# Patient Record
Sex: MALE | Race: WHITE | Employment: UNEMPLOYED | URBAN - NONMETROPOLITAN AREA
[De-identification: names, ages, dates, MRNs, and addresses within clinical notes are randomized per-mention and may not be internally consistent; named-entity substitution may affect disease eponyms.]

---

## 2023-09-15 ENCOUNTER — OUTSIDE SERVICES (OUTPATIENT)
Dept: FAMILY MEDICINE CLINIC | Age: 88
End: 2023-09-15

## 2023-09-15 DIAGNOSIS — N18.4 STAGE 4 CHRONIC KIDNEY DISEASE (HCC): ICD-10-CM

## 2023-09-15 DIAGNOSIS — E87.5 HYPERKALEMIA: Primary | ICD-10-CM

## 2023-09-15 DIAGNOSIS — I48.91 ATRIAL FIBRILLATION, UNSPECIFIED TYPE (HCC): ICD-10-CM

## 2023-09-15 DIAGNOSIS — I25.10 CORONARY ARTERY DISEASE, UNSPECIFIED VESSEL OR LESION TYPE, UNSPECIFIED WHETHER ANGINA PRESENT, UNSPECIFIED WHETHER NATIVE OR TRANSPLANTED HEART: ICD-10-CM

## 2023-09-15 DIAGNOSIS — I50.9 CONGESTIVE HEART FAILURE, UNSPECIFIED HF CHRONICITY, UNSPECIFIED HEART FAILURE TYPE (HCC): ICD-10-CM

## 2023-09-15 NOTE — PROGRESS NOTES
09/14/2023      Alden Castro  11/5/1933    This resident is being seen today for follow-up evaluation visit. He is a resident who has long-term medical conditions including acute on chronic systolic congestive heart failure complicated by pneumonia, chronic kidney disease, coronary disease, COPD, mitral valvular disease status post mitral valve replacement, cardiac arrhythmia/atrial fibrillation. He is a 80 y.o. male resident who is being seen today for follow-up visit with which she has been under assessment for hyperkalemia and I had concerns that this could be secondary to his Entresto. I did ask staff to contact his cardiologist in this regard and they stated that they have faxed him 4 times and call did still have no response back. I did make recommendations for repeat BMP in this regard. Staff also has concerns because this resident is still driving himself and they have asked for speech therapy to evaluate and treat regarding cognition. He is a gentleman who was recently given the benefit of an occupational evaluation for scooter use but still goes and exercises daily and is able to walk without much difficulty. He offers no complaints regarding pain on today's evaluation and denies any headaches or dizziness, sore throat, chest pain, coughing or shortness of breath, nausea or vomiting, constipation or diarrhea, fever or chills, falls or syncopal events. Medications:  Aspirin 81 mg daily  Entresto 12-13 mg twice daily  Fluticasone 50 mcg 2 sprays each nostril daily  Furosemide 40 mg twice daily  Metoprolol succinate 25 mg daily  PreserVision reds 1 capsule twice daily  Spironolactone 25 mg daily  Trazodone 100 mg at bedtime  Trelegy 200-62 point 5-25 1 puff daily  Vitamin D 5000 units daily  Ferrous sulfate 325 mg every other day  Albuterol every 4 hours as needed          Objective     Vital Signs: BP 84/47 pulse 78 respirations 18 temperature 97.2 O2 96% weight 183. 3 pounds      Physical

## 2023-10-26 ENCOUNTER — OUTSIDE SERVICES (OUTPATIENT)
Dept: FAMILY MEDICINE CLINIC | Age: 88
End: 2023-10-26

## 2023-10-26 DIAGNOSIS — I05.9 MITRAL VALVE DISEASE: ICD-10-CM

## 2023-10-26 DIAGNOSIS — I50.9 CONGESTIVE HEART FAILURE, UNSPECIFIED HF CHRONICITY, UNSPECIFIED HEART FAILURE TYPE (HCC): ICD-10-CM

## 2023-10-26 DIAGNOSIS — R09.89 RUNNY NOSE: Primary | ICD-10-CM

## 2023-10-26 DIAGNOSIS — N18.4 STAGE 4 CHRONIC KIDNEY DISEASE (HCC): ICD-10-CM

## 2023-10-26 DIAGNOSIS — I25.10 CORONARY ARTERY DISEASE, UNSPECIFIED VESSEL OR LESION TYPE, UNSPECIFIED WHETHER ANGINA PRESENT, UNSPECIFIED WHETHER NATIVE OR TRANSPLANTED HEART: ICD-10-CM

## 2023-10-27 NOTE — PROGRESS NOTES
09/14/2023      Kerline Contreras  11/5/1933    This resident is being seen today for follow-up evaluation visit. He is a resident who has long-term medical conditions including acute on chronic systolic congestive heart failure complicated by pneumonia, chronic kidney disease, coronary disease, COPD, mitral valvular disease status post mitral valve replacement, cardiac arrhythmia/atrial fibrillation. He is a 80 y.o. male resident who is being seen today for follow-up evaluation with resident indicating that he has had some degree of a runny nose. He denies any sore throat, ear pain, coughing or shortness of breath, chest pain, nausea or vomiting, constipation, dysuria or frequency, fever or chills, falls or syncopal events. He is scheduled to be seen in conjunction with the Department of dentistry today. He did bring to my attention that he has bowel movements 4-5 times a day but does not describe them as diarrhea. Medications:  Aspirin 81 mg daily  Entresto 12-13 mg twice daily  Fluticasone 50 mcg 2 sprays each nostril daily  Furosemide 40 mg twice daily  Metoprolol succinate 25 mg daily  PreserVision reds 1 capsule twice daily  Spironolactone 25 mg daily  Trazodone 100 mg at bedtime  Trelegy 200-62 point 5-25 1 puff daily  Vitamin D 5000 units daily  Ferrous sulfate 325 mg every other day  Albuterol every 4 hours as needed          Objective     Vital Signs: /46 pulse 96 respirations 18 temperature 97.3 O2 94% weight 182 pounds      Physical examination:Skin is essentially warm and dry. HEENT unremarkable. Neck is supple. Heart with some question of underlying atrial fibrillation. No rubs, gallops or murmurs noted. Lungs are consistent with bibasilar rales with no evidence of wheezing noted at this time. Abdomen is soft, supple and non-tender. Bowel sounds are noted x4 quadrants. No rigidity, guarding or rebound tenderness. Negative Monroe's, negative McBurney's, negative Derrek's.   Extremities;

## 2023-11-10 ENCOUNTER — OUTSIDE SERVICES (OUTPATIENT)
Dept: FAMILY MEDICINE CLINIC | Age: 88
End: 2023-11-10

## 2023-11-10 DIAGNOSIS — R60.9 PERIPHERAL EDEMA: ICD-10-CM

## 2023-11-10 DIAGNOSIS — E87.5 HYPERKALEMIA: Primary | ICD-10-CM

## 2023-11-10 DIAGNOSIS — R19.4 FREQUENT BOWEL MOVEMENTS: ICD-10-CM

## 2023-11-10 DIAGNOSIS — N18.4 STAGE 4 CHRONIC KIDNEY DISEASE (HCC): ICD-10-CM

## 2023-11-10 DIAGNOSIS — R63.5 WEIGHT GAIN: ICD-10-CM

## 2023-11-11 NOTE — PROGRESS NOTES
09/14/2023      Megan Lopez  11/5/1933    This resident is being seen today for follow-up evaluation visit. He is a resident who has long-term medical conditions including acute on chronic systolic congestive heart failure complicated by pneumonia, chronic kidney disease, coronary disease, COPD, mitral valvular disease status post mitral valve replacement, cardiac arrhythmia/atrial fibrillation. He is a 80 y.o. male resident who is being seen today for follow-up visit with this resident alert and oriented and states that he is up about every 2 hours and has about 5 bowel movements per day. He is a gentleman who has been under assessment for edema to the lower extremities but is nonadherent to offloading and refuses BARRY hose. He denies any headaches or dizziness, sore throat, chest pain, coughing or shortness of breath, nausea or vomiting, constipation or diarrhea, fever or chills, falls or syncopal events. Medications:  Aspirin 81 mg daily  Entresto 12-13 mg twice daily  Fluticasone 50 mcg 2 sprays each nostril daily  Furosemide 40 mg twice daily  Metoprolol succinate 25 mg daily  PreserVision reds 1 capsule twice daily  Trazodone 100 mg at bedtime  Trelegy 200-62 point 5-25 1 puff daily  Vitamin D 5000 units daily  Ferrous sulfate 325 mg every other day  Albuterol every 4 hours as needed  Zaroxolyn 2.5 mg M-W-F  Probiotic twice daily          Objective     Vital Signs: /57 pulse 100 respirations 18 temperature 96.8 O2 98% weight 188. 4 pounds (up 6.4 pounds)        Physical examination:Skin is essentially warm and dry. HEENT unremarkable. Neck is supple. Heart with some question of underlying atrial fibrillation. No rubs, gallops or murmurs noted. Lungs are consistent with bibasilar rales with no evidence of wheezing noted at this time. Abdomen is soft, supple and non-tender. Bowel sounds are noted x4 quadrants. No rigidity, guarding or rebound tenderness.   Negative Monroe's, negative

## 2023-12-07 ENCOUNTER — OUTSIDE SERVICES (OUTPATIENT)
Dept: FAMILY MEDICINE CLINIC | Age: 88
End: 2023-12-07

## 2023-12-07 DIAGNOSIS — I05.9 MITRAL VALVE DISEASE: ICD-10-CM

## 2023-12-07 DIAGNOSIS — I50.9 CONGESTIVE HEART FAILURE, UNSPECIFIED HF CHRONICITY, UNSPECIFIED HEART FAILURE TYPE (HCC): ICD-10-CM

## 2023-12-07 DIAGNOSIS — I25.10 CORONARY ARTERY DISEASE, UNSPECIFIED VESSEL OR LESION TYPE, UNSPECIFIED WHETHER ANGINA PRESENT, UNSPECIFIED WHETHER NATIVE OR TRANSPLANTED HEART: Primary | ICD-10-CM

## 2023-12-07 DIAGNOSIS — N18.4 STAGE 4 CHRONIC KIDNEY DISEASE (HCC): ICD-10-CM

## 2023-12-07 DIAGNOSIS — I48.91 ATRIAL FIBRILLATION, UNSPECIFIED TYPE (HCC): ICD-10-CM

## 2023-12-08 NOTE — PROGRESS NOTES
09/14/2023      Glorious Bloch  11/5/1933    This resident is being seen today for follow-up evaluation visit. He is a resident who has long-term medical conditions including acute on chronic systolic congestive heart failure complicated by pneumonia, chronic kidney disease, coronary disease, COPD, mitral valvular disease status post mitral valve replacement, cardiac arrhythmia/atrial fibrillation. He is a 80 y.o. male resident who is being seen today for follow-up evaluation with this resident remaining essentially alert and oriented x 3. He does drive himself and indicates that he has an eye doctor appointment today. He is stable but states that he urinates quite frequently, at least every 2 hours, but does indicate that it is from his diuretic management. He offers no complaints with respect to any headaches or dizziness, sore throat, chest pain, coughing or shortness of breath, nausea or vomiting, constipation or diarrhea, fever or chills, falls or syncopal events. Medications:  Aspirin 81 mg daily  Entresto 12-13 mg twice daily  Fluticasone 50 mcg 2 sprays each nostril daily  Furosemide 40 mg twice daily  Metoprolol succinate 25 mg daily  PreserVision reds 1 capsule twice daily  Trazodone 100 mg at bedtime  Trelegy 200-62 point 5-25 1 puff daily  Vitamin D 5000 units daily  Ferrous sulfate 325 mg every other day  Albuterol every 4 hours as needed  Zaroxolyn 2.5 mg M-W-F  Probiotic twice daily          Objective     Vital Signs: /57 pulse 100 respirations 18 temperature 96.8 O2 98% weight 188. 4 pounds (up 6.4 pounds)        Physical examination:Skin is essentially warm and dry. HEENT unremarkable. Neck is supple. Heart with some question of underlying atrial fibrillation. No rubs, gallops or murmurs noted. Lungs are consistent with bibasilar rales with no evidence of wheezing noted at this time. Abdomen is soft, supple and non-tender. Bowel sounds are noted x4 quadrants.  No rigidity, guarding

## 2024-01-17 ENCOUNTER — OUTSIDE SERVICES (OUTPATIENT)
Dept: FAMILY MEDICINE CLINIC | Age: 89
End: 2024-01-17
Payer: MEDICARE

## 2024-01-17 DIAGNOSIS — N18.4 STAGE 4 CHRONIC KIDNEY DISEASE (HCC): ICD-10-CM

## 2024-01-17 DIAGNOSIS — R26.81 UNSTEADY: Primary | ICD-10-CM

## 2024-01-17 DIAGNOSIS — E55.9 VITAMIN D DEFICIENCY: ICD-10-CM

## 2024-01-17 DIAGNOSIS — I05.9 MITRAL VALVE DISEASE: ICD-10-CM

## 2024-01-17 DIAGNOSIS — J44.9 CHRONIC OBSTRUCTIVE PULMONARY DISEASE, UNSPECIFIED COPD TYPE (HCC): ICD-10-CM

## 2024-01-17 PROCEDURE — 99349 HOME/RES VST EST MOD MDM 40: CPT | Performed by: NURSE PRACTITIONER

## 2024-01-17 NOTE — PROGRESS NOTES
09/14/2023      Vinay Robbins  11/5/1933    This resident is being seen today for a follow-up visit.  He is a resident who has long-term medical conditions including acute on chronic systolic congestive heart failure complicated by pneumonia, chronic kidney disease, coronary disease, COPD, mitral valvular disease status post mitral valve replacement, cardiac arrhythmia/atrial fibrillation.  He is a 89 y.o. male resident who is being seen today for a follow-up evaluation with which this resident admits that he is relatively unsteady on his feet.  He states that this just occurs at times and he does not feel that he needs any therapy.  He offers no complaints regarding any pain at this time and furthermore denies any headaches or dizziness, sore throat, chest pain, coughing or shortness of breath, nausea or vomiting, constipation or diarrhea, fever or chills, syncopal events or seizure activity.        Medications:  Aspirin 81 mg daily  Entresto 12-13 mg twice daily  Fluticasone 50 mcg 2 sprays each nostril daily  Furosemide 40 mg  daily  Metoprolol succinate 25 mg daily  PreserVision reds 1 capsule twice daily  Trazodone 100 mg at bedtime  Trelegy 200-62 point 5-25 1 puff daily  Vitamin D 5000 units daily  Ferrous sulfate 325 mg every other day  Albuterol every 4 hours as needed  Zaroxolyn 2.5 mg M-W-F  Probiotic twice daily          Objective     Vital Signs: /73 pulse 79 respirations 18 temperature 96.9 O2 95% weight 187.4 pounds       Physical examination:Skin is essentially warm and dry. HEENT unremarkable. Neck is supple. Heart with some question of underlying atrial fibrillation. No rubs, gallops or murmurs noted.  Lungs are consistent with bibasilar rales with no evidence of wheezing noted at this time.  Abdomen is soft, supple and non-tender.  Bowel sounds are noted x4 quadrants. No rigidity, guarding or rebound tenderness.  Negative Monroe's, negative McBurney's, negative Derrek's.  Extremities; mild

## 2024-02-09 ENCOUNTER — OUTSIDE SERVICES (OUTPATIENT)
Dept: FAMILY MEDICINE CLINIC | Age: 89
End: 2024-02-09

## 2024-02-09 DIAGNOSIS — N18.4 STAGE 4 CHRONIC KIDNEY DISEASE (HCC): ICD-10-CM

## 2024-02-09 DIAGNOSIS — R09.81 NASAL CONGESTION: ICD-10-CM

## 2024-02-09 DIAGNOSIS — I48.91 ATRIAL FIBRILLATION, UNSPECIFIED TYPE (HCC): ICD-10-CM

## 2024-02-09 DIAGNOSIS — F51.01 PRIMARY INSOMNIA: Primary | ICD-10-CM

## 2024-02-09 DIAGNOSIS — I50.9 CONGESTIVE HEART FAILURE, UNSPECIFIED HF CHRONICITY, UNSPECIFIED HEART FAILURE TYPE (HCC): ICD-10-CM

## 2024-02-09 NOTE — PROGRESS NOTES
2/8/2024      Vinay Robbins  11/5/1933    This resident is being seen today for a follow-up visit.  He is a resident who has long-term medical conditions including acute on chronic systolic congestive heart failure complicated by pneumonia, chronic kidney disease, coronary disease, COPD, mitral valvular disease status post mitral valve replacement, cardiac arrhythmia/atrial fibrillation.  He is a 89 y.o. male resident who is being seen today for a follow-up visit with this gentleman indicating that he has some degree of insomnia, which has been an ongoing issue for him and he has been treated for this.  He does indicate that he has had some rhinitis like symptoms and he has been utilizing fluticasone but states that his symptoms are more in the nighttime hours and he uses this medication in the morning.  I did therefore recommend him to try the medication at night and determine if it is beneficial.  He otherwise denies any complaints such as headaches or dizziness, sore throat, chest pain, coughing or shortness of breath, nausea or vomiting, constipation or diarrhea, fever or chills, falls or syncopal events.        Medications:  Aspirin 81 mg daily  Entresto 12-13 mg twice daily  Fluticasone 50 mcg 2 sprays each nostril daily  Furosemide 40 mg  daily  Metoprolol succinate 25 mg daily  PreserVision reds 1 capsule twice daily  Trazodone 100 mg at bedtime  Trelegy 200-62 point 5-25 1 puff daily  Vitamin D 5000 units daily  Ferrous sulfate 325 mg every other day  Albuterol every 4 hours as needed  Zaroxolyn 2.5 mg M-W-F  Probiotic twice daily  Melatonin 3 mg at bedtime          Objective     Vital Signs: /78 pulse 95 respirations 16 temperature 96.6 O2 95% weight 192.8 pounds (previously 187.4)      Physical examination:Skin is essentially warm and dry. HEENT unremarkable. Neck is supple. Heart with some question of underlying atrial fibrillation. No rubs, gallops or murmurs noted.  Lungs are consistent with

## 2024-04-10 ENCOUNTER — OUTSIDE SERVICES (OUTPATIENT)
Dept: FAMILY MEDICINE CLINIC | Age: 89
End: 2024-04-10

## 2024-04-10 DIAGNOSIS — N18.4 STAGE 4 CHRONIC KIDNEY DISEASE (HCC): ICD-10-CM

## 2024-04-10 DIAGNOSIS — R09.81 NASAL CONGESTION: Primary | ICD-10-CM

## 2024-04-10 DIAGNOSIS — I25.10 CORONARY ARTERY DISEASE, UNSPECIFIED VESSEL OR LESION TYPE, UNSPECIFIED WHETHER ANGINA PRESENT, UNSPECIFIED WHETHER NATIVE OR TRANSPLANTED HEART: ICD-10-CM

## 2024-04-10 DIAGNOSIS — B35.3 TINEA PEDIS OF BOTH FEET: ICD-10-CM

## 2024-04-10 DIAGNOSIS — F51.01 PRIMARY INSOMNIA: ICD-10-CM

## 2024-04-11 NOTE — PROGRESS NOTES
feet to be used at bedtime due to his complaints of itchy toes.  He will otherwise continue with the plan of care and I will see him routinely and as needed with further orders forthcoming.      AMARILYS JIMÉNEZ, APRN - CNP      *Note was creating using voice recognition software.  The document was reviewed however grammatical errors may exist.

## 2024-04-24 LAB
ALBUMIN SERPL-MCNC: 3.8 G/DL (ref 3.5–5.2)
ALP SERPL-CCNC: 127 U/L (ref 40–129)
ALT SERPL-CCNC: 10 U/L (ref 0–40)
ANION GAP SERPL CALCULATED.3IONS-SCNC: 16 MMOL/L (ref 7–16)
AST SERPL-CCNC: 19 U/L (ref 0–39)
BILIRUB SERPL-MCNC: 0.9 MG/DL (ref 0–1.2)
BUN SERPL-MCNC: 58 MG/DL (ref 6–23)
CALCIUM SERPL-MCNC: 9.4 MG/DL (ref 8.6–10.2)
CHLORIDE SERPL-SCNC: 98 MMOL/L (ref 98–107)
CO2 SERPL-SCNC: 28 MMOL/L (ref 22–29)
CREAT SERPL-MCNC: 1.9 MG/DL (ref 0.7–1.2)
GFR SERPL CREATININE-BSD FRML MDRD: 33 ML/MIN/1.73M2
GLUCOSE SERPL-MCNC: 94 MG/DL (ref 74–99)
POTASSIUM SERPL-SCNC: 4.2 MMOL/L (ref 3.5–5)
PROT SERPL-MCNC: 7.5 G/DL (ref 6.4–8.3)
SODIUM SERPL-SCNC: 142 MMOL/L (ref 132–146)

## 2024-05-02 ENCOUNTER — OUTSIDE SERVICES (OUTPATIENT)
Dept: FAMILY MEDICINE CLINIC | Age: 89
End: 2024-05-02
Payer: MEDICARE

## 2024-05-02 DIAGNOSIS — I50.9 CONGESTIVE HEART FAILURE, UNSPECIFIED HF CHRONICITY, UNSPECIFIED HEART FAILURE TYPE (HCC): ICD-10-CM

## 2024-05-02 DIAGNOSIS — E55.9 VITAMIN D DEFICIENCY: ICD-10-CM

## 2024-05-02 DIAGNOSIS — J44.9 CHRONIC OBSTRUCTIVE PULMONARY DISEASE, UNSPECIFIED COPD TYPE (HCC): ICD-10-CM

## 2024-05-02 DIAGNOSIS — I48.91 ATRIAL FIBRILLATION, UNSPECIFIED TYPE (HCC): ICD-10-CM

## 2024-05-02 DIAGNOSIS — F51.01 PRIMARY INSOMNIA: Primary | ICD-10-CM

## 2024-05-02 PROCEDURE — 99349 HOME/RES VST EST MOD MDM 40: CPT | Performed by: NURSE PRACTITIONER

## 2024-05-02 RX ORDER — TEMAZEPAM 15 MG/1
15 CAPSULE ORAL NIGHTLY PRN
Qty: 90 CAPSULE | Refills: 0 | Status: SHIPPED | OUTPATIENT
Start: 2024-05-02 | End: 2024-07-31

## 2024-05-03 NOTE — PROGRESS NOTES
wheezing noted at this time.  Abdomen is soft, supple and non-tender.  Bowel sounds are noted x4 quadrants. No rigidity, guarding or rebound tenderness.  Negative Monroe's, negative McBurney's, negative Derrek's.  Extremities; mild pitting edema to the lower extremities.  Pulses are adequate. No clubbing  or no cyanosis noted.  Neurologically he  is alert and oriented x3.  No evidence of paralysis or paresthesias noted.    Diagnoses and all orders for this visit:    Primary insomnia  Comments:  Initiate temazepam 15 mg at bedtime and discontinue trazodone with melatonin when available    Chronic obstructive pulmonary disease, unspecified COPD type (HCC)  Comments:  Maintain albuterol and fluticasone    Vitamin D deficiency  Comments:  Controlled with vitamin D supplementation    Atrial fibrillation, unspecified type (HCC)  Comments:  Continue with aspirin and metoprolol    Congestive heart failure, unspecified HF chronicity, unspecified heart failure type (HCC)  Comments:  Continue with diuretic management and Entresto                          Plan:  Plan of care was discussed with the healthcare team with medications and labs reviewed.  Due to the ongoing insomnia, I will recommend discontinuation of the trazodone as well as melatonin and I will initiate temazepam.  I would like the trazodone to be continued until the temazepam at 15 mg is available.  I will then plan to reevaluate him in the course of the next 2 to 3 weeks to determine if the medication change has been effective.  He will otherwise continue with the current management and I will see him routinely and as needed with further orders forthcoming.      AMARILYS JIMÉNEZ, APRN - CNP      *Note was creating using voice recognition software.  The document was reviewed however grammatical errors may exist.

## 2024-05-23 LAB
ANION GAP SERPL CALCULATED.3IONS-SCNC: 21 MMOL/L (ref 7–16)
BUN SERPL-MCNC: 62 MG/DL (ref 6–23)
CALCIUM SERPL-MCNC: 9.1 MG/DL (ref 8.6–10.2)
CHLORIDE SERPL-SCNC: 97 MMOL/L (ref 98–107)
CO2 SERPL-SCNC: 23 MMOL/L (ref 22–29)
CREAT SERPL-MCNC: 1.8 MG/DL (ref 0.7–1.2)
GFR, ESTIMATED: 37 ML/MIN/1.73M2
GLUCOSE SERPL-MCNC: 90 MG/DL (ref 74–99)
POTASSIUM SERPL-SCNC: 4.1 MMOL/L (ref 3.5–5)
SODIUM SERPL-SCNC: 141 MMOL/L (ref 132–146)

## 2024-06-20 ENCOUNTER — OUTSIDE SERVICES (OUTPATIENT)
Dept: FAMILY MEDICINE CLINIC | Age: 89
End: 2024-06-20

## 2024-06-20 DIAGNOSIS — N18.4 STAGE 4 CHRONIC KIDNEY DISEASE (HCC): ICD-10-CM

## 2024-06-20 DIAGNOSIS — I25.10 CORONARY ARTERY DISEASE, UNSPECIFIED VESSEL OR LESION TYPE, UNSPECIFIED WHETHER ANGINA PRESENT, UNSPECIFIED WHETHER NATIVE OR TRANSPLANTED HEART: ICD-10-CM

## 2024-06-20 DIAGNOSIS — R05.1 ACUTE COUGH: Primary | ICD-10-CM

## 2024-06-20 DIAGNOSIS — F51.01 PRIMARY INSOMNIA: ICD-10-CM

## 2024-06-20 DIAGNOSIS — I48.91 ATRIAL FIBRILLATION, UNSPECIFIED TYPE (HCC): ICD-10-CM

## 2024-06-20 NOTE — PROGRESS NOTES
6/20/2024      Vinay Robbins  11/5/1933    This resident is being seen today for a follow-up visit.  He is a resident who has long-term medical conditions including acute on chronic systolic congestive heart failure complicated by pneumonia, chronic kidney disease, coronary disease, COPD, mitral valvular disease status post mitral valve replacement, cardiac arrhythmia/atrial fibrillation.  He is a 89 y.o. male resident who is being seen today for a follow-up evaluation with which this resident states that he has a runny nose and he coughs when he eats.  He states this essentially occurs every time-meal.  I did offer him the benefit of speaking with speech therapy but he did decline but was more willing to have an MBS completed.  He has no headaches or dizziness, sore throat, chest pain, nausea or vomiting, constipation or diarrhea, fever or chills, falls or syncopal events.        Medications:  Amoxicillin 2 g 1 hour prior to appointment  Aspirin 81 mg daily  Ferrous sulfate 325 mg every other day   fluticasone 50 mcg 2 sprays each nostril daily  Furosemide 40 mg  daily  Lotrimin powder to the bilateral feet at bedtime  Metolazone 1.25 mg M-W-F  Metoprolol succinate 25 mg daily  PreserVision reds 1 capsule twice daily  Probiotic twice daily  Qvar 80 mcg 2 puffs twice daily  Saline nasal spray 0.65% 1-2 times per day  Temazepam 15 mg at bedtime  Vitamin D3 5000 units daily          Objective     Vital Signs: /67 pulse 90 respirations 16 temperature 96.6 O2 96% weight 197.2 pounds      Physical examination:Skin is essentially warm and dry. HEENT unremarkable. Neck is supple. Heart with some question of underlying atrial fibrillation. No rubs, gallops or murmurs noted.  Lungs are consistent with bibasilar rales with no evidence of wheezing noted at this time.  Abdomen is soft, supple and non-tender.  Bowel sounds are noted x4 quadrants. No rigidity, guarding or rebound tenderness.  Negative Monroe's, negative

## 2024-06-26 DIAGNOSIS — F51.01 PRIMARY INSOMNIA: ICD-10-CM

## 2024-06-26 RX ORDER — TEMAZEPAM 15 MG/1
15 CAPSULE ORAL NIGHTLY PRN
Qty: 90 CAPSULE | Refills: 0 | Status: SHIPPED | OUTPATIENT
Start: 2024-06-26 | End: 2024-09-24

## 2024-07-23 DIAGNOSIS — F51.01 PRIMARY INSOMNIA: ICD-10-CM

## 2024-07-24 RX ORDER — TEMAZEPAM 15 MG/1
15 CAPSULE ORAL NIGHTLY PRN
Qty: 90 CAPSULE | Refills: 0 | Status: SHIPPED | OUTPATIENT
Start: 2024-07-24 | End: 2024-10-22

## 2024-07-30 ENCOUNTER — OUTSIDE SERVICES (OUTPATIENT)
Dept: FAMILY MEDICINE CLINIC | Age: 89
End: 2024-07-30

## 2024-07-30 DIAGNOSIS — Z00.8 ENCOUNTER FOR OTHER GENERAL EXAMINATION: Primary | ICD-10-CM

## 2024-08-15 ENCOUNTER — OUTSIDE SERVICES (OUTPATIENT)
Dept: FAMILY MEDICINE CLINIC | Age: 89
End: 2024-08-15

## 2024-08-15 DIAGNOSIS — E55.9 VITAMIN D DEFICIENCY: ICD-10-CM

## 2024-08-15 DIAGNOSIS — E87.6 HYPOKALEMIA: Primary | ICD-10-CM

## 2024-08-15 DIAGNOSIS — N18.4 STAGE 4 CHRONIC KIDNEY DISEASE (HCC): ICD-10-CM

## 2024-08-15 DIAGNOSIS — I48.91 ATRIAL FIBRILLATION, UNSPECIFIED TYPE (HCC): ICD-10-CM

## 2024-08-15 DIAGNOSIS — F51.01 PRIMARY INSOMNIA: ICD-10-CM

## 2024-08-15 NOTE — PROGRESS NOTES
8/15/2024      Vinay Robbins  11/5/1933    This resident is being seen today for a follow-up visit.  He is a resident who has long-term medical conditions including acute on chronic systolic congestive heart failure complicated by pneumonia, chronic kidney disease, coronary disease, COPD, mitral valvular disease status post mitral valve replacement, cardiac arrhythmia/atrial fibrillation.  He is a 89 y.o. male resident who is being seen today for a follow-up visit with ongoing concerns regarding insomnia despite current treatment.  He denies any complaints today regarding any pain, headaches or dizziness, sore throat, chest pain, nausea or vomiting, constipation or diarrhea, fever or chills, falls or syncopal events.  He does remain alert and oriented and independently ambulatory without ambulatory aids.        Medications:  Amoxicillin 2 g 1 hour prior to appointment  Aspirin 81 mg daily  Ferrous sulfate 325 mg every other day   fluticasone 50 mcg 2 sprays each nostril daily  Furosemide 40 mg  daily  Lotrimin powder to the bilateral feet at bedtime  Metolazone 1.25 mg M-W-F  Metoprolol succinate 25 mg daily  PreserVision reds 1 capsule twice daily  Probiotic twice daily  Qvar 80 mcg 2 puffs twice daily  Saline nasal spray 0.65% 1-2 times per day  Temazepam 30 mg at bedtime  Vitamin D3 5000 units daily  Micro 10 meq daily        Objective     Vital Signs: /61 pulse 97 respirations 16 temperature 97.2 O2 94% weight 199.8 pounds      Physical examination:Skin is essentially warm and dry. HEENT unremarkable. Neck is supple. Heart regular rate and rhythm with no rubs, gallops or murmurs noted.  Lungs are consistent with bibasilar rales with no evidence of wheezing noted at this time.  Abdomen is soft, supple and non-tender.  Bowel sounds are noted x4 quadrants. No rigidity, guarding or rebound tenderness.  Negative Monroe's, negative McBurney's, negative Derrek's.  Extremities; mild pitting edema to the lower

## 2024-08-16 DIAGNOSIS — F51.01 PRIMARY INSOMNIA: ICD-10-CM

## 2024-08-16 RX ORDER — TEMAZEPAM 30 MG/1
30 CAPSULE ORAL NIGHTLY PRN
COMMUNITY

## 2024-08-16 RX ORDER — TEMAZEPAM 30 MG/1
30 CAPSULE ORAL NIGHTLY PRN
Qty: 30 CAPSULE | Refills: 0 | Status: CANCELLED | OUTPATIENT
Start: 2024-08-16 | End: 2024-09-15

## 2024-08-25 VITALS
SYSTOLIC BLOOD PRESSURE: 77 MMHG | BODY MASS INDEX: 27.61 KG/M2 | WEIGHT: 197.2 LBS | TEMPERATURE: 96 F | HEART RATE: 50 BPM | HEIGHT: 71 IN | RESPIRATION RATE: 18 BRPM | DIASTOLIC BLOOD PRESSURE: 50 MMHG

## 2024-08-25 SDOH — ECONOMIC STABILITY: INCOME INSECURITY: HOW HARD IS IT FOR YOU TO PAY FOR THE VERY BASICS LIKE FOOD, HOUSING, MEDICAL CARE, AND HEATING?: NOT HARD AT ALL

## 2024-08-25 SDOH — ECONOMIC STABILITY: FOOD INSECURITY: WITHIN THE PAST 12 MONTHS, THE FOOD YOU BOUGHT JUST DIDN'T LAST AND YOU DIDN'T HAVE MONEY TO GET MORE.: NEVER TRUE

## 2024-08-25 SDOH — ECONOMIC STABILITY: FOOD INSECURITY: WITHIN THE PAST 12 MONTHS, YOU WORRIED THAT YOUR FOOD WOULD RUN OUT BEFORE YOU GOT MONEY TO BUY MORE.: NEVER TRUE

## 2024-08-25 ASSESSMENT — PATIENT HEALTH QUESTIONNAIRE - PHQ9
1. LITTLE INTEREST OR PLEASURE IN DOING THINGS: NOT AT ALL
SUM OF ALL RESPONSES TO PHQ QUESTIONS 1-9: 0
2. FEELING DOWN, DEPRESSED OR HOPELESS: NOT AT ALL
SUM OF ALL RESPONSES TO PHQ9 QUESTIONS 1 & 2: 0

## 2024-08-25 ASSESSMENT — LIFESTYLE VARIABLES
HOW MANY STANDARD DRINKS CONTAINING ALCOHOL DO YOU HAVE ON A TYPICAL DAY: PATIENT DOES NOT DRINK
HOW OFTEN DO YOU HAVE A DRINK CONTAINING ALCOHOL: NEVER

## 2024-08-27 NOTE — PROGRESS NOTES
Medicare Annual Wellness Visit    Vinay Robbins is here for Medicare AWV    Assessment & Plan   Encounter for other general examination  Recommendations for Preventive Services Due: see orders and patient instructions/AVS.  Recommended screening schedule for the next 5-10 years is provided to the patient in written form: see Patient Instructions/AVS.     Return in 1 year (on 7/30/2025) for Medicare Annual Wellness Visit in 1 year.     Subjective       Patient's complete Health Risk Assessment and screening values have been reviewed and are found in Flowsheets. The following problems were reviewed today and where indicated follow up appointments were made and/or referrals ordered.    Positive Risk Factor Screenings with Interventions:       Cognitive:   Clock Drawing Test (CDT): (!) Abnormal  Words recalled: 3 Words Recalled  Total Score: 3  Total Score Interpretation: Normal Mini-Cog  Interventions:  Patient comments: Patient is being monitored at the facility.            Inactivity:  On average, how many days per week do you engage in moderate to strenuous exercise (like a brisk walk)?: 2 days (!) Abnormal  On average, how many minutes do you engage in exercise at this level?: 20 min  Interventions:  Patient declined any further interventions or treatment          ADL's:   Patient reports needing help with:  Select all that apply: (!) Laundry, Housekeeping, Food Preparation  Interventions:  Patient comments: Patient receives help at the assisted living facility.                  Objective   Vitals:    07/30/24 1120   BP: (!) 77/50   Pulse: 50   Resp: 18   Temp: (!) 96 °F (35.6 °C)   Weight: 89.4 kg (197 lb 3.2 oz)   Height: 1.791 m (5' 10.5\")      Body mass index is 27.9 kg/m².                  No Known Allergies  Prior to Visit Medications    Medication Sig Taking? Authorizing Provider   temazepam (RESTORIL) 30 MG capsule Take 1 capsule by mouth nightly as needed for Sleep. Yes Laura Lagunas, APRN - CNP

## 2024-09-07 ENCOUNTER — OUTSIDE SERVICES (OUTPATIENT)
Dept: FAMILY MEDICINE CLINIC | Age: 89
End: 2024-09-07

## 2024-09-07 DIAGNOSIS — N18.4 STAGE 4 CHRONIC KIDNEY DISEASE (HCC): ICD-10-CM

## 2024-09-07 DIAGNOSIS — J44.9 CHRONIC OBSTRUCTIVE PULMONARY DISEASE, UNSPECIFIED COPD TYPE (HCC): ICD-10-CM

## 2024-09-07 DIAGNOSIS — L55.9 SUNBURN: Primary | ICD-10-CM

## 2024-09-07 DIAGNOSIS — I25.10 CORONARY ARTERY DISEASE, UNSPECIFIED VESSEL OR LESION TYPE, UNSPECIFIED WHETHER ANGINA PRESENT, UNSPECIFIED WHETHER NATIVE OR TRANSPLANTED HEART: ICD-10-CM

## 2024-09-07 DIAGNOSIS — I48.91 ATRIAL FIBRILLATION, UNSPECIFIED TYPE (HCC): ICD-10-CM

## 2024-09-07 NOTE — PROGRESS NOTES
9/5/2024      Vinay Robbins  11/5/1933    This resident is being seen today for an acute visit.  He is a resident who has long-term medical conditions including acute on chronic systolic congestive heart failure complicated by pneumonia, chronic kidney disease, coronary disease, COPD, mitral valvular disease status post mitral valve replacement, cardiac arrhythmia/atrial fibrillation.  He is a 89 y.o. male resident who is being seen today for an acute evaluation visit per request of staff secondary to some abnormality to his scalp.  He was noted to have serosanguineous blisters on the top of his scalp.  He admits that he had gone out Monday with his family and spent all day outside in the sun and had not had a hat on for any sunscreen.  He states it is not bothersome to him and that he has no pain, itching or burning associated with it.  He furthermore has not been sick in any way in terms of coughing or shortness of breath no nausea or vomiting, constipation or diarrhea, fever or chills, falls or syncopal events.      Medications:  Amoxicillin 2 g 1 hour prior to appointment  Aspirin 81 mg daily  Ferrous sulfate 325 mg every other day   fluticasone 50 mcg 2 sprays each nostril daily  Furosemide 40 mg  daily  Lotrimin powder to the bilateral feet at bedtime  Metolazone 1.25 mg M-W-F  Metoprolol succinate 25 mg daily  PreserVision reds 1 capsule twice daily  Probiotic twice daily  Qvar 80 mcg 2 puffs twice daily  Saline nasal spray 0.65% 1-2 times per day  Temazepam 30 mg at bedtime  Vitamin D3 5000 units daily  Micro 10 meq daily        Objective     Vital Signs: /62 pulse 88 respirations 16 temperature 97.2 O2 96% weight 200 pounds      Physical examination:Skin is essentially warm and dry.  He does have multiple areas noted to his scalp which do appear to be consistent with previous blister formations and some redness to the surrounding area consistent with a burn.  HEENT unremarkable. Neck is supple. Heart

## 2024-09-11 LAB
ANION GAP SERPL CALCULATED.3IONS-SCNC: 20 MMOL/L (ref 7–16)
BUN SERPL-MCNC: 67 MG/DL (ref 6–23)
CALCIUM SERPL-MCNC: 9.1 MG/DL (ref 8.6–10.2)
CHLORIDE SERPL-SCNC: 98 MMOL/L (ref 98–107)
CO2 SERPL-SCNC: 25 MMOL/L (ref 22–29)
CREAT SERPL-MCNC: 1.8 MG/DL (ref 0.7–1.2)
GFR, ESTIMATED: 36 ML/MIN/1.73M2
GLUCOSE SERPL-MCNC: 87 MG/DL (ref 74–99)
POTASSIUM SERPL-SCNC: 3.6 MMOL/L (ref 3.5–5)
SODIUM SERPL-SCNC: 143 MMOL/L (ref 132–146)

## 2024-09-12 ENCOUNTER — OUTSIDE SERVICES (OUTPATIENT)
Dept: FAMILY MEDICINE CLINIC | Age: 89
End: 2024-09-12
Payer: MEDICARE

## 2024-09-12 DIAGNOSIS — I05.9 MITRAL VALVE DISEASE: ICD-10-CM

## 2024-09-12 DIAGNOSIS — N18.4 STAGE 4 CHRONIC KIDNEY DISEASE (HCC): Primary | ICD-10-CM

## 2024-09-12 DIAGNOSIS — L55.9 SUNBURN: ICD-10-CM

## 2024-09-12 DIAGNOSIS — F51.01 PRIMARY INSOMNIA: ICD-10-CM

## 2024-09-12 DIAGNOSIS — E55.9 VITAMIN D DEFICIENCY: ICD-10-CM

## 2024-09-12 PROCEDURE — 99349 HOME/RES VST EST MOD MDM 40: CPT | Performed by: NURSE PRACTITIONER

## 2024-09-18 ENCOUNTER — OUTSIDE SERVICES (OUTPATIENT)
Dept: FAMILY MEDICINE CLINIC | Age: 89
End: 2024-09-18
Payer: MEDICARE

## 2024-09-18 DIAGNOSIS — S91.115A LACERATION OF LESSER TOE OF LEFT FOOT WITHOUT FOREIGN BODY PRESENT OR DAMAGE TO NAIL, INITIAL ENCOUNTER: ICD-10-CM

## 2024-09-18 DIAGNOSIS — J44.9 CHRONIC OBSTRUCTIVE PULMONARY DISEASE, UNSPECIFIED COPD TYPE (HCC): ICD-10-CM
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
## 2024-09-18 DIAGNOSIS — I48.91 ATRIAL FIBRILLATION, UNSPECIFIED TYPE (HCC): ICD-10-CM

## 2024-09-18 DIAGNOSIS — M25.532 LEFT WRIST PAIN: ICD-10-CM

## 2024-09-18 PROCEDURE — 99349 HOME/RES VST EST MOD MDM 40: CPT | Performed by: NURSE PRACTITIONER

## 2024-09-19 LAB
ANION GAP SERPL CALCULATED.3IONS-SCNC: 25 MMOL/L (ref 7–16)
BUN SERPL-MCNC: 53 MG/DL (ref 6–23)
CALCIUM SERPL-MCNC: 9.5 MG/DL (ref 8.6–10.2)
CHLORIDE SERPL-SCNC: 101 MMOL/L (ref 98–107)
CO2 SERPL-SCNC: 21 MMOL/L (ref 22–29)
CREAT SERPL-MCNC: 1.7 MG/DL (ref 0.7–1.2)
GFR, ESTIMATED: 39 ML/MIN/1.73M2
GLUCOSE SERPL-MCNC: 189 MG/DL (ref 74–99)
POTASSIUM SERPL-SCNC: 3.8 MMOL/L (ref 3.5–5)
SODIUM SERPL-SCNC: 147 MMOL/L (ref 132–146)

## 2024-10-17 ENCOUNTER — OUTSIDE SERVICES (OUTPATIENT)
Dept: PRIMARY CARE CLINIC | Age: 89
End: 2024-10-17
Payer: MEDICARE

## 2024-10-17 DIAGNOSIS — J44.9 CHRONIC OBSTRUCTIVE PULMONARY DISEASE, UNSPECIFIED COPD TYPE (HCC): ICD-10-CM

## 2024-10-17 DIAGNOSIS — I48.91 ATRIAL FIBRILLATION, UNSPECIFIED TYPE (HCC): ICD-10-CM

## 2024-10-17 DIAGNOSIS — R60.0 PERIPHERAL EDEMA: ICD-10-CM

## 2024-10-17 DIAGNOSIS — U07.1 COVID: Primary | ICD-10-CM

## 2024-10-17 DIAGNOSIS — N18.32 STAGE 3B CHRONIC KIDNEY DISEASE (HCC): ICD-10-CM

## 2024-10-17 PROCEDURE — 99349 HOME/RES VST EST MOD MDM 40: CPT | Performed by: NURSE PRACTITIONER

## 2024-10-17 NOTE — PROGRESS NOTES
weeks to reevaluate his kidney function.  He will furthermore remain in isolation precautions regarding his underlying COVID we will continue to monitor him regarding symptoms and I will see him routinely and as needed with further orders forthcoming.    AMARILYS JIMÉNEZ, APRN - CNP      *Note was creating using voice recognition software.  The document was reviewed however grammatical errors may exist.

## 2024-10-18 DIAGNOSIS — G47.00 INSOMNIA, UNSPECIFIED TYPE: Primary | ICD-10-CM

## 2024-10-18 NOTE — TELEPHONE ENCOUNTER
Name of Medication(s) Requested:  Requested Prescriptions     Pending Prescriptions Disp Refills    temazepam (RESTORIL) 30 MG capsule       Sig: Take 1 capsule by mouth nightly as needed for Sleep. Max Daily Amount: 30 mg       Medication is on current medication list Yes    Dosage and directions were verified? Yes    Quantity verified: 30 day supply     Pharmacy Verified?  Yes    Last Appointment:  Visit date not found    Future appts:  No future appointments.     (If no appt send self scheduling link. .[email or web address])  Scheduling request sent?     [] Yes  [x] No    Does patient need updated?  [] Yes  [x] No

## 2024-11-09 RX ORDER — TEMAZEPAM 30 MG/1
30 CAPSULE ORAL NIGHTLY PRN
Qty: 30 CAPSULE | Refills: 0 | Status: SHIPPED | OUTPATIENT
Start: 2024-11-09 | End: 2024-12-09

## 2024-12-18 DIAGNOSIS — G47.00 INSOMNIA, UNSPECIFIED TYPE: Primary | ICD-10-CM

## 2024-12-18 RX ORDER — METOPROLOL SUCCINATE 25 MG/1
25 TABLET, EXTENDED RELEASE ORAL DAILY
COMMUNITY
End: 2024-12-18 | Stop reason: SDUPTHER

## 2024-12-18 RX ORDER — POTASSIUM CHLORIDE 750 MG/1
10 CAPSULE, EXTENDED RELEASE ORAL DAILY
COMMUNITY
End: 2024-12-18 | Stop reason: SDUPTHER

## 2024-12-18 RX ORDER — FERROUS SULFATE 325(65) MG
325 TABLET ORAL EVERY OTHER DAY
COMMUNITY
End: 2024-12-18 | Stop reason: SDUPTHER

## 2024-12-18 RX ORDER — FLUTICASONE PROPIONATE 50 MCG
2 SPRAY, SUSPENSION (ML) NASAL NIGHTLY
COMMUNITY
End: 2024-12-18 | Stop reason: SDUPTHER

## 2024-12-18 RX ORDER — METOLAZONE 2.5 MG/1
2.5 TABLET ORAL DAILY
COMMUNITY
End: 2024-12-18 | Stop reason: SDUPTHER

## 2024-12-18 RX ORDER — TEMAZEPAM 30 MG/1
30 CAPSULE ORAL NIGHTLY PRN
COMMUNITY
End: 2024-12-18 | Stop reason: SDUPTHER

## 2024-12-18 RX ORDER — ASPIRIN 81 MG/1
81 TABLET ORAL DAILY
COMMUNITY
End: 2024-12-18 | Stop reason: SDUPTHER

## 2024-12-18 RX ORDER — FUROSEMIDE 20 MG/1
20 TABLET ORAL SEE ADMIN INSTRUCTIONS
COMMUNITY
End: 2024-12-18 | Stop reason: SDUPTHER

## 2024-12-18 RX ORDER — TOLNAFTATE 1 G/100G
POWDER TOPICAL 2 TIMES DAILY
COMMUNITY
End: 2024-12-18 | Stop reason: SDUPTHER

## 2024-12-18 NOTE — TELEPHONE ENCOUNTER
Name of Medication(s) Requested:  Requested Prescriptions     Pending Prescriptions Disp Refills    metOLazone (ZAROXOLYN) 2.5 MG tablet 30 tablet 0     Sig: Take 1 tablet by mouth daily 1/2 tan (1.25mg) every Mon, Wed, Fri and Saturday       Medication is on current medication list Yes    Dosage and directions were verified? Yes    Quantity verified: 30 day supply     Pharmacy Verified?  Yes    Last Appointment:  Visit date not found    Future appts:  No future appointments.     (If no appt send self scheduling link. .REFILLAPPT)  Scheduling request sent?     [] Yes  [x] No    Does patient need updated?  [] Yes  [x] No  
verified? Yes    Quantity verified: 90 day supply     Pharmacy Verified?  Yes    Last Appointment:  Visit date not found    Future appts:  No future appointments.     (If no appt send self scheduling link. .REFILLAPPT)  Scheduling request sent?     [] Yes  [x] No    Does patient need updated?  [] Yes  [x] No

## 2025-01-05 RX ORDER — METOPROLOL SUCCINATE 25 MG/1
25 TABLET, EXTENDED RELEASE ORAL DAILY
Qty: 90 TABLET | Refills: 3 | Status: SHIPPED | OUTPATIENT
Start: 2025-01-05

## 2025-01-05 RX ORDER — FUROSEMIDE 20 MG/1
20 TABLET ORAL SEE ADMIN INSTRUCTIONS
Qty: 36 TABLET | Refills: 3 | Status: SHIPPED | OUTPATIENT
Start: 2025-01-05

## 2025-01-05 RX ORDER — METOLAZONE 2.5 MG/1
2.5 TABLET ORAL DAILY
Qty: 30 TABLET | Refills: 0 | Status: SHIPPED | OUTPATIENT
Start: 2025-01-05

## 2025-01-05 RX ORDER — ASPIRIN 81 MG/1
81 TABLET ORAL DAILY
Qty: 30 TABLET | Refills: 5 | Status: SHIPPED | OUTPATIENT
Start: 2025-01-05

## 2025-01-05 RX ORDER — L.ACID/L.CASEI/B.BIF/B.LON/FOS 2B CELL-50
1 CAPSULE ORAL 2 TIMES DAILY
Qty: 180 CAPSULE | Refills: 3 | Status: SHIPPED | OUTPATIENT
Start: 2025-01-05

## 2025-01-05 RX ORDER — FERROUS SULFATE 325(65) MG
325 TABLET ORAL EVERY OTHER DAY
Qty: 45 TABLET | Refills: 3 | Status: SHIPPED | OUTPATIENT
Start: 2025-01-05

## 2025-01-05 RX ORDER — ANTIOX #8/OM3/DHA/EPA/LUT/ZEAX 250-2.5 MG
1 CAPSULE ORAL 2 TIMES DAILY
Qty: 180 CAPSULE | Refills: 3 | Status: SHIPPED | OUTPATIENT
Start: 2025-01-05

## 2025-01-05 RX ORDER — TEMAZEPAM 30 MG/1
30 CAPSULE ORAL NIGHTLY PRN
Qty: 90 CAPSULE | Refills: 0 | Status: SHIPPED | OUTPATIENT
Start: 2025-01-05 | End: 2025-04-05

## 2025-01-05 RX ORDER — POTASSIUM CHLORIDE 750 MG/1
10 CAPSULE, EXTENDED RELEASE ORAL DAILY
Qty: 90 CAPSULE | Refills: 3 | Status: SHIPPED | OUTPATIENT
Start: 2025-01-05

## 2025-01-05 RX ORDER — FLUTICASONE PROPIONATE 50 MCG
2 SPRAY, SUSPENSION (ML) NASAL NIGHTLY
Qty: 16 G | Refills: 5 | Status: SHIPPED | OUTPATIENT
Start: 2025-01-05

## 2025-01-05 RX ORDER — TOLNAFTATE 1 G/100G
POWDER TOPICAL 2 TIMES DAILY
Qty: 1 EACH | Refills: 5 | Status: SHIPPED | OUTPATIENT
Start: 2025-01-05

## 2025-01-06 RX ORDER — METOLAZONE 2.5 MG/1
TABLET ORAL
Qty: 30 TABLET | Refills: 0 | Status: CANCELLED | OUTPATIENT
Start: 2025-01-06

## 2025-01-27 ENCOUNTER — OUTSIDE SERVICES (OUTPATIENT)
Dept: PRIMARY CARE CLINIC | Age: 89
End: 2025-01-27
Payer: MEDICARE

## 2025-01-27 DIAGNOSIS — R09.81 NASAL CONGESTION: ICD-10-CM

## 2025-01-27 DIAGNOSIS — J44.9 CHRONIC OBSTRUCTIVE PULMONARY DISEASE, UNSPECIFIED COPD TYPE (HCC): ICD-10-CM

## 2025-01-27 DIAGNOSIS — N18.4 STAGE 4 CHRONIC KIDNEY DISEASE (HCC): ICD-10-CM

## 2025-01-27 DIAGNOSIS — F51.01 PRIMARY INSOMNIA: Primary | ICD-10-CM

## 2025-01-27 DIAGNOSIS — R26.89 BALANCE PROBLEM: ICD-10-CM

## 2025-01-27 PROBLEM — I50.9 CONGESTIVE HEART FAILURE, UNSPECIFIED HF CHRONICITY, UNSPECIFIED HEART FAILURE TYPE (HCC): Status: ACTIVE | Noted: 2025-01-27

## 2025-01-27 PROBLEM — I48.91 ATRIAL FIBRILLATION, UNSPECIFIED TYPE (HCC): Status: ACTIVE | Noted: 2025-01-27

## 2025-01-27 PROCEDURE — 99349 HOME/RES VST EST MOD MDM 40: CPT | Performed by: NURSE PRACTITIONER

## 2025-01-28 RX ORDER — RAMELTEON 8 MG/1
8 TABLET ORAL NIGHTLY
Qty: 90 TABLET | Refills: 1 | Status: SHIPPED | OUTPATIENT
Start: 2025-01-28

## 2025-01-28 RX ORDER — CETIRIZINE HYDROCHLORIDE 10 MG/1
10 TABLET ORAL DAILY
COMMUNITY
End: 2025-01-28 | Stop reason: SDUPTHER

## 2025-01-28 RX ORDER — CETIRIZINE HYDROCHLORIDE 10 MG/1
10 TABLET ORAL DAILY
Qty: 30 TABLET | Refills: 0 | Status: SHIPPED | OUTPATIENT
Start: 2025-01-28

## 2025-01-28 RX ORDER — RAMELTEON 8 MG/1
8 TABLET ORAL NIGHTLY
COMMUNITY
End: 2025-01-28 | Stop reason: SDUPTHER

## 2025-01-28 NOTE — TELEPHONE ENCOUNTER
Name of Medication(s) Requested:  Requested Prescriptions     Pending Prescriptions Disp Refills    cetirizine (ZYRTEC) 10 MG tablet 30 tablet 0     Sig: Take 1 tablet by mouth daily    ramelteon (ROZEREM) 8 MG tablet 90 tablet 1     Sig: Take 1 tablet by mouth nightly       Medication is on current medication list Yes    Dosage and directions were verified? Yes    Quantity verified: 90 day supply     Pharmacy Verified?  Yes    Last Appointment:  Visit date not found    Future appts:  No future appointments.     (If no appt send self scheduling link. .REFILLAPPT)  Scheduling request sent?     [] Yes  [x] No    Does patient need updated?  [] Yes  [x] NoName of Medication(s) Requested:  Requested Prescriptions     Pending Prescriptions Disp Refills    cetirizine (ZYRTEC) 10 MG tablet 30 tablet 0     Sig: Take 1 tablet by mouth daily       Medication is on current medication list Yes    Dosage and directions were verified? Yes    Quantity verified: 30 day supply     Pharmacy Verified?  Yes    Last Appointment:  Visit date not found    Future appts:  No future appointments.     (If no appt send self scheduling link. .REFILLAPPT)  Scheduling request sent?     [] Yes  [x] No    Does patient need updated?  [] Yes  [x] No

## 2025-02-17 ENCOUNTER — OUTSIDE SERVICES (OUTPATIENT)
Dept: PRIMARY CARE CLINIC | Age: 89
End: 2025-02-17

## 2025-02-17 DIAGNOSIS — N32.81 OVERACTIVE BLADDER: Primary | ICD-10-CM

## 2025-02-17 DIAGNOSIS — I50.9 CONGESTIVE HEART FAILURE, UNSPECIFIED HF CHRONICITY, UNSPECIFIED HEART FAILURE TYPE (HCC): ICD-10-CM

## 2025-02-17 DIAGNOSIS — R26.89 BALANCE PROBLEM: ICD-10-CM

## 2025-02-17 DIAGNOSIS — I48.91 ATRIAL FIBRILLATION, UNSPECIFIED TYPE (HCC): ICD-10-CM

## 2025-02-17 DIAGNOSIS — F51.01 PRIMARY INSOMNIA: ICD-10-CM

## 2025-02-18 NOTE — PROGRESS NOTES
2/17/2025      Vinay Robbins  11/5/1933    This resident is being seen today for a follow-up visit.  He is a resident who has long-term medical conditions including acute on chronic systolic congestive heart failure complicated by pneumonia, chronic kidney disease, coronary disease, COPD, mitral valvular disease status post mitral valve replacement, cardiac arrhythmia/atrial fibrillation.  He is a 89 y.o. male resident who is being seen today for a follow-up visit with which I did recently place this resident on Ditropan given the fact that he was having insomnia related to urinary urgency.  Staff states that they thought he had been sleeping better with the first thing that he stated to me today was that he \"cannot sleep.\"  He relates it to being up every 2 hours to urinate.  He did furthermore state to me that he feels a little off balance with which I offered the benefit of physical therapy which he did decline and states that he is going to do exercises including the NuStep.  He also states that he gets a runny nose especially with eating and he feels that he has some congestion of his chest but denies a cough and admits to only slight shortness of breath.  He has no chest pain, nausea or vomiting, constipation or diarrhea, fever or chills, falls or syncopal events.  He does feel that he takes too many pills and he was concerned about the pricing, especially of Jardiance.  He was recently placed on this per the recommendations of Dr. Rodriguez due to his underlying heart related issues.      Medications:  Amoxicillin 2 g 1 hour prior to appointment  Aspirin 81 mg daily  Ferrous sulfate 325 mg every other day   fluticasone 50 mcg 2 sprays each nostril daily  Furosemide 20 mg  daily  Jardiance 10 mg daily  Lotrimin powder to the bilateral feet at bedtime  Metolazone 1.25 mg M-W-F and Saturday  Metoprolol succinate 25 mg daily  Potassium chloride 10 mill equivalents daily  PreserVision reds 1 capsule twice

## 2025-03-05 ENCOUNTER — OUTSIDE SERVICES (OUTPATIENT)
Dept: PRIMARY CARE CLINIC | Age: 89
End: 2025-03-05
Payer: MEDICARE

## 2025-03-05 DIAGNOSIS — F51.01 PRIMARY INSOMNIA: ICD-10-CM

## 2025-03-05 DIAGNOSIS — G47.00 INSOMNIA, UNSPECIFIED TYPE: Primary | ICD-10-CM

## 2025-03-05 DIAGNOSIS — I50.9 CONGESTIVE HEART FAILURE, UNSPECIFIED HF CHRONICITY, UNSPECIFIED HEART FAILURE TYPE (HCC): ICD-10-CM

## 2025-03-05 DIAGNOSIS — I48.91 ATRIAL FIBRILLATION, UNSPECIFIED TYPE (HCC): ICD-10-CM

## 2025-03-05 DIAGNOSIS — N18.32 STAGE 3B CHRONIC KIDNEY DISEASE (HCC): ICD-10-CM

## 2025-03-05 DIAGNOSIS — R09.89 CHEST CONGESTION: Primary | ICD-10-CM

## 2025-03-05 PROCEDURE — 99349 HOME/RES VST EST MOD MDM 40: CPT | Performed by: NURSE PRACTITIONER

## 2025-03-05 RX ORDER — DARIDOREXANT 25 MG/1
25 TABLET, FILM COATED ORAL NIGHTLY
COMMUNITY
End: 2025-03-05 | Stop reason: SDUPTHER

## 2025-03-05 NOTE — TELEPHONE ENCOUNTER
Name of Medication(s) Requested:  Requested Prescriptions     Pending Prescriptions Disp Refills    Daridorexant HCl (QUVIVIQ) 25 MG TABS 30 tablet 0     Sig: Take 25 mg by mouth nightly Max Daily Amount: 25 mg       Medication is on current medication list Yes    Dosage and directions were verified? Yes    Quantity verified: 30 day supply     Pharmacy Verified?  Yes    Last Appointment:  Visit date not found    Future appts:  No future appointments.     (If no appt send self scheduling link. .REFILLAPPT)  Scheduling request sent?     [] Yes  [x] No    Does patient need updated?  [] Yes  [x] No

## 2025-03-05 NOTE — PROGRESS NOTES
is already on diuretic management and he has recommendations for chest x-ray and was already an extra oral dose of Lasix.  At this point I am going to await the chest x-ray but in the meantime I will discontinue his Lasix and his Zaroxolyn and I will place him on Bumex 1 mg daily.  I will ask that staff monitor his weights weekly for 4 weeks and get a BMP in 1 week.  He did furthermore state that he still has ongoing insomnia despite treatment I will discontinue his ramelteon and place him on Quviviq 25 mg at bedtime and plan to reevaluate him in the course of the next couple of weeks.  He will furthermore continue with the current plan of care and I will see him routinely and as needed with further orders forthcoming.    AMARILYS JIMÉNEZ, APRN - CNP      *Note was creating using voice recognition software.  The document was reviewed however grammatical errors may exist.

## 2025-03-12 ENCOUNTER — OUTSIDE SERVICES (OUTPATIENT)
Dept: PRIMARY CARE CLINIC | Age: 89
End: 2025-03-12

## 2025-03-12 DIAGNOSIS — N18.32 STAGE 3B CHRONIC KIDNEY DISEASE (HCC): ICD-10-CM

## 2025-03-12 DIAGNOSIS — R06.02 SHORTNESS OF BREATH: Primary | ICD-10-CM

## 2025-03-12 DIAGNOSIS — N32.81 OVERACTIVE BLADDER: ICD-10-CM

## 2025-03-12 DIAGNOSIS — F51.01 PRIMARY INSOMNIA: ICD-10-CM

## 2025-03-12 DIAGNOSIS — J44.9 CHRONIC OBSTRUCTIVE PULMONARY DISEASE, UNSPECIFIED COPD TYPE (HCC): ICD-10-CM

## 2025-03-12 NOTE — PROGRESS NOTES
3/12/2025      Vinay Robbins  11/5/1933    This resident is being seen today for a follow-up visit.  He is a resident who has long-term medical conditions including acute on chronic systolic congestive heart failure complicated by pneumonia, chronic kidney disease, coronary disease, COPD, mitral valvular disease status post mitral valve replacement, cardiac arrhythmia/atrial fibrillation.  He is a 89 y.o. male resident who is being seen today for a follow-up evaluation visit with staff indicating that he still complains of shortness of breath especially when lying down and indicated to me that he also has some degree of chest tightness which she describes as chronic.  He does not describe it as a chest pressure.  I did just evaluate him in the recent week past and recommended a chest x-ray which showed central pulmonary venous congestion and I had made changes with respect to his diuretic management.  He furthermore has been under assessment for underlying insomnia and I had recommended Quviviq which she has yet to receive.  He has no headaches, sore throat, chest pain, nausea or vomiting, constipation or diarrhea, fever or chills, falls or syncopal events.  He does have a dressing intact to the right hand secondary to a blood draw with subsequent trauma.            Medications:  Amoxicillin 2 g 1 hour prior to appointment  Aspirin 81 mg daily  Bumex 1 mg daily  Ferrous sulfate 325 mg every other day   fluticasone 50 mcg 2 sprays each nostril daily  Jardiance 10 mg daily  Lotrimin powder to the bilateral feet at bedtime  Metoprolol succinate 25 mg daily  Potassium chloride 10 mill equivalents daily  PreserVision reds 1 capsule twice daily  Probiotic twice daily  Qvar 80 mcg 2 puffs twice daily  Quviviq 25 mg at bedtime  Saline nasal spray 0.65% 1-2 times per day  Vitamin D3 5000 units daily  Zyrtec 10 mg at bedtime x 1 month  Ditropan 10 mg at bedtime      Objective     Vital Signs: /74 pulse 90 respirations

## 2025-03-14 RX ORDER — DARIDOREXANT 25 MG/1
25 TABLET, FILM COATED ORAL NIGHTLY
Qty: 30 TABLET | Refills: 0 | Status: SHIPPED | OUTPATIENT
Start: 2025-03-14

## 2025-04-02 ENCOUNTER — OUTSIDE SERVICES (OUTPATIENT)
Dept: PRIMARY CARE CLINIC | Age: 89
End: 2025-04-02
Payer: MEDICARE

## 2025-04-02 DIAGNOSIS — N32.81 OVERACTIVE BLADDER: Primary | ICD-10-CM

## 2025-04-02 DIAGNOSIS — F51.01 PRIMARY INSOMNIA: ICD-10-CM

## 2025-04-02 DIAGNOSIS — R63.5 WEIGHT GAIN: ICD-10-CM

## 2025-04-02 DIAGNOSIS — N18.32 STAGE 3B CHRONIC KIDNEY DISEASE (HCC): ICD-10-CM

## 2025-04-02 DIAGNOSIS — I48.91 ATRIAL FIBRILLATION, UNSPECIFIED TYPE (HCC): ICD-10-CM

## 2025-04-02 PROCEDURE — 99349 HOME/RES VST EST MOD MDM 40: CPT | Performed by: NURSE PRACTITIONER

## 2025-04-03 NOTE — PROGRESS NOTES
4/2/2025      Vinay Robbins  11/5/1933    This resident is being seen today for an acute visit.  He is a resident who has long-term medical conditions including acute on chronic systolic congestive heart failure complicated by pneumonia, chronic kidney disease, coronary disease, COPD, mitral valvular disease status post mitral valve replacement, cardiac arrhythmia/atrial fibrillation.  He is a 89 y.o. male resident who is being seen today for an acute evaluation per the request of staff secondary to concerns of his sleeping habits.  He continues to tell staffing that he cannot sleep but after further discussion with him it appears that his sleeping problem is caused from the fact that he is up urinating.  However after further discussion it does appear that he gets at least anywhere from 6 to 9 hours of sleep but it is interrupted from getting up to urinate 2-3 times during the nighttime hours.  He has had several sleeping medications in the recent months past which have been ineffective in terms of helping him fall asleep.  He has no complaints of headaches or dizziness, sore throat, chest pain, coughing or shortness of breath, nausea or vomiting, constipation or diarrhea, fever or chills, falls or syncopal events.  It is of note to mention that staff did bring to my attention that his oxygen saturation upon awakening was only 88 to 89%.            Medications:  Amoxicillin 2 g 1 hour prior to appointment  Aspirin 81 mg daily  Bumex 1 mg daily  Ferrous sulfate 325 mg every other day   fluticasone 50 mcg 2 sprays each nostril daily  Jardiance 10 mg daily  Lotrimin powder to the bilateral feet at bedtime  Metoprolol succinate 25 mg daily  Potassium chloride 10 mill equivalents daily  PreserVision reds 1 capsule twice daily  Probiotic twice daily  Qvar 80 mcg 2 puffs twice daily  Saline nasal spray 0.65% 1-2 times per day  Vitamin D3 5000 units daily  Zyrtec 10 mg at bedtime x 1 month  Ditropan 10 mg twice

## 2025-05-19 ENCOUNTER — OUTSIDE SERVICES (OUTPATIENT)
Dept: PRIMARY CARE CLINIC | Age: 89
End: 2025-05-19
Payer: MEDICARE

## 2025-05-19 DIAGNOSIS — N32.81 OVERACTIVE BLADDER: ICD-10-CM

## 2025-05-19 DIAGNOSIS — L85.3 DRY SKIN: Primary | ICD-10-CM

## 2025-05-19 DIAGNOSIS — F51.01 PRIMARY INSOMNIA: ICD-10-CM

## 2025-05-19 DIAGNOSIS — N18.32 STAGE 3B CHRONIC KIDNEY DISEASE (HCC): ICD-10-CM

## 2025-05-19 DIAGNOSIS — I48.91 ATRIAL FIBRILLATION, UNSPECIFIED TYPE (HCC): ICD-10-CM

## 2025-05-19 PROCEDURE — 99349 HOME/RES VST EST MOD MDM 40: CPT | Performed by: NURSE PRACTITIONER

## 2025-05-19 NOTE — PROGRESS NOTES
5/19/2025      Vinay Robbins  11/5/1933    This resident is being seen today for a follow-up evaluation.  He is a resident who has long-term medical conditions including acute on chronic systolic congestive heart failure complicated by pneumonia, chronic kidney disease, coronary disease, COPD, mitral valvular disease status post mitral valve replacement, cardiac arrhythmia/atrial fibrillation.  He is a 89 y.o. male resident who is being seen today for a follow-up visit with which he apparently continued to complain of sleep related issues to staffing but had no complaints during my evaluation.  He states that he gets up every morning and has breakfast and then he lays back down until his pills come.  He did not complain of any insomnia.  We have been monitoring his orthostatic blood pressures which were relatively controlled.  He had no complaints today with respect to any pain, headaches or dizziness, sore throat, chest pain, coughing or shortness of breath, nausea or vomiting, constipation or diarrhea, fever or chills, falls or syncopal events.        Medications:  Aspirin 81 mg daily  Bumex 1 mg daily  D3 2000 units daily  Deep sea nasal spray 0.65% 2 sprays each nostril 1-2 times daily  Ferrous sulfate 325 mg every other day   fluticasone 50 mcg 2 sprays each nostril daily  Jardiance 10 mg daily  Lotrimin powder to the bilateral feet at bedtime  Metoprolol succinate 25 mg daily  Oxybutynin 10 mg twice daily  Potassium chloride 10 mill equivalents daily  PreserVision reds 1 capsule twice daily  Probiotic twice daily  Qvar 80 mcg 2 puffs twice daily  Trazodone 25 mg at bedtime        Objective     Vital Signs: /68 pulse 85 respirations 17 temperature 97.5 O2 96% weight 205 pounds       Physical examination:Skin is essentially warm and dry.  He does have dry flaky skin noted to his back.  HEENT unremarkable. Neck is supple. Heart regular rate and rhythm at this time with underlying atrial fibrillation.  Lungs

## 2025-06-04 ENCOUNTER — OUTSIDE SERVICES (OUTPATIENT)
Dept: PRIMARY CARE CLINIC | Age: 89
End: 2025-06-04
Payer: MEDICARE

## 2025-06-04 DIAGNOSIS — N18.32 STAGE 3B CHRONIC KIDNEY DISEASE (HCC): ICD-10-CM

## 2025-06-04 DIAGNOSIS — I48.91 ATRIAL FIBRILLATION, UNSPECIFIED TYPE (HCC): ICD-10-CM

## 2025-06-04 DIAGNOSIS — R60.0 UNILATERAL EDEMA OF LOWER EXTREMITY: Primary | ICD-10-CM

## 2025-06-04 DIAGNOSIS — F51.01 PRIMARY INSOMNIA: ICD-10-CM

## 2025-06-04 DIAGNOSIS — N32.81 OVERACTIVE BLADDER: ICD-10-CM

## 2025-06-04 PROCEDURE — 99349 HOME/RES VST EST MOD MDM 40: CPT | Performed by: NURSE PRACTITIONER

## 2025-06-04 NOTE — PROGRESS NOTES
6/4/2025      Vinay Robbins  11/5/1933    This resident is being seen today for a follow-up evaluation.  He is a resident who has long-term medical conditions including acute on chronic systolic congestive heart failure complicated by pneumonia, chronic kidney disease, coronary disease, COPD, mitral valvular disease status post mitral valve replacement, cardiac arrhythmia/atrial fibrillation.  He is a 89 y.o. male resident who is being seen today for a follow-up evaluation who had gone out with his daughter earlier today and states that he went out for lunch.  He feels that he has been doing well but he did admit that he still has concerns regarding insomnia.  He states that he just has a hard time settling down as his mind does not quit racing.  However, he states that he is always up every 2 hours regardless to urinate.  He has been on multiple medical modalities with respect to insomnia and I have even given him the benefit of medication for overactive bladder.  He states that he probably has had a problem with this for as long as he can remember.  He has no current headaches or dizziness, sore throat, chest pain, nausea or vomiting, constipation or diarrhea, fever or chills, falls or syncopal events.      Medications:  Aspirin 81 mg daily  Bumex 1 mg daily  D3 2000 units daily  Deep sea nasal spray 0.65% 2 sprays each nostril 1-2 times daily  Ferrous sulfate 325 mg every other day   fluticasone 50 mcg 2 sprays each nostril daily  Jardiance 10 mg daily  Lotrimin powder to the bilateral feet at bedtime  Metoprolol succinate 25 mg daily  Oxybutynin 10 mg twice daily  Potassium chloride 10 mill equivalents daily  PreserVision reds 1 capsule twice daily  Probiotic 1 billion daily  Qvar 80 mcg 2 puffs twice daily  Trazodone 25 mg at bedtime        Objective     Vital Signs: /55 pulse 85 respirations 17 temperature 98.9 O2 95% weight 206 pounds       Physical examination:Skin is essentially warm and dry.  He does